# Patient Record
Sex: FEMALE | Race: OTHER | HISPANIC OR LATINO | URBAN - METROPOLITAN AREA
[De-identification: names, ages, dates, MRNs, and addresses within clinical notes are randomized per-mention and may not be internally consistent; named-entity substitution may affect disease eponyms.]

---

## 2018-10-06 ENCOUNTER — EMERGENCY (EMERGENCY)
Facility: HOSPITAL | Age: 38
LOS: 1 days | Discharge: HOME | End: 2018-10-06
Attending: EMERGENCY MEDICINE | Admitting: EMERGENCY MEDICINE
Payer: COMMERCIAL

## 2018-10-06 VITALS
WEIGHT: 95.02 LBS | HEIGHT: 59 IN | SYSTOLIC BLOOD PRESSURE: 122 MMHG | RESPIRATION RATE: 18 BRPM | HEART RATE: 71 BPM | DIASTOLIC BLOOD PRESSURE: 76 MMHG | OXYGEN SATURATION: 100 % | TEMPERATURE: 98 F

## 2018-10-06 DIAGNOSIS — S61.212A LACERATION WITHOUT FOREIGN BODY OF RIGHT MIDDLE FINGER WITHOUT DAMAGE TO NAIL, INITIAL ENCOUNTER: ICD-10-CM

## 2018-10-06 PROCEDURE — 99283 EMERGENCY DEPT VISIT LOW MDM: CPT | Mod: 25

## 2018-10-06 PROCEDURE — 99283 EMERGENCY DEPT VISIT LOW MDM: CPT

## 2018-10-06 PROCEDURE — 90715 TDAP VACCINE 7 YRS/> IM: CPT

## 2018-10-06 PROCEDURE — 90471 IMMUNIZATION ADMIN: CPT

## 2018-10-06 RX ORDER — TETANUS TOXOID, REDUCED DIPHTHERIA TOXOID AND ACELLULAR PERTUSSIS VACCINE, ADSORBED 5; 2.5; 8; 8; 2.5 [IU]/.5ML; [IU]/.5ML; UG/.5ML; UG/.5ML; UG/.5ML
0.5 SUSPENSION INTRAMUSCULAR ONCE
Qty: 0 | Refills: 0 | Status: COMPLETED | OUTPATIENT
Start: 2018-10-06 | End: 2018-10-06

## 2018-10-06 RX ADMIN — TETANUS TOXOID, REDUCED DIPHTHERIA TOXOID AND ACELLULAR PERTUSSIS VACCINE, ADSORBED 0.5 MILLILITER(S): 5; 2.5; 8; 8; 2.5 SUSPENSION INTRAMUSCULAR at 21:13

## 2018-10-06 NOTE — ED PROVIDER NOTE - ATTENDING CONTRIBUTION TO CARE
Dr. Talyor: I performed a face to face bedside interview with patient regarding history of present illness, review of symptoms and past medical history. I completed an independent physical exam.  I have discussed patient's plan of care with PA.   I agree with note as stated above, having amended the EMR as needed to reflect my findings.   This includes HISTORY OF PRESENT ILLNESS, HIV, PAST MEDICAL/SURGICAL/FAMILY/SOCIAL HISTORY, ALLERGIES AND HOME MEDICATIONS, REVIEW OF SYSTEMS, PHYSICAL EXAM, and any PROGRESS NOTES during the time I functioned as the attending physician for this patient.  right 3rd fingertip avulsion laceration with small amount of bleeding, from, will apply pressure and apply dsd BHARGAV Taylor DO

## 2018-10-06 NOTE — ED ADULT NURSE NOTE - NSIMPLEMENTINTERV_GEN_ALL_ED
Implemented All Universal Safety Interventions:  [place] to call system. Call bell, personal items and telephone within reach. Instruct patient to call for assistance. Room bathroom lighting operational. Non-slip footwear when patient is off stretcher. Physically safe environment: no spills, clutter or unnecessary equipment. Stretcher in lowest position, wheels locked, appropriate side rails in place.

## 2018-10-06 NOTE — ED PROVIDER NOTE - MEDICAL DECISION MAKING DETAILS
37 y/o F with avulsion laceration not suturable- Up date Tdap, wound care, pressure dressing, PMD follow up, infection precautions

## 2018-10-06 NOTE — ED PROVIDER NOTE - OBJECTIVE STATEMENT
37 y/o F no pmh presents with right 3rd finger tip laceration occurring at 3:30pm today. Works at a [employer] and states she "sliced" the top of her finger in th . Stopped bleeding but then started again prior to arrival.  Denies weakness, numbness, tingling, pain with movement. Right hand dominant. Tdap- unsure

## 2018-10-06 NOTE — ED PROVIDER NOTE - PLAN OF CARE
Follow up with your PMD within 48-72 hours for wound check. Keep covered and dry for 24 hours then remove dressing and clean with soap and water daily.  Apply bacitracin and cover. Worsening, continued or ANY new concerning symptoms return to the emergency department. You received a Tetanus shot today (Tdap).

## 2018-10-06 NOTE — ED PROVIDER NOTE - PROGRESS NOTE DETAILS
Copious irrigation, Bacitracin, pressure dressing applied. Tdap up dated. Strict infection precautions discussed

## 2018-10-06 NOTE — ED PROVIDER NOTE - CARE PLAN
Principal Discharge DX:	Avulsion of skin of finger, initial encounter  Assessment and plan of treatment:	Follow up with your PMD within 48-72 hours for wound check. Keep covered and dry for 24 hours then remove dressing and clean with soap and water daily.  Apply bacitracin and cover. Worsening, continued or ANY new concerning symptoms return to the emergency department. You received a Tetanus shot today (Tdap).

## 2020-08-13 NOTE — ED ADULT NURSE NOTE - CADM POA CENTRAL LINE
No
no dermatitis, no environmental allergies, no food allergies, no immunosuppressive disorder, and no pruritus.

## 2021-04-20 ENCOUNTER — RESULT REVIEW (OUTPATIENT)
Age: 41
End: 2021-04-20

## 2022-10-27 ENCOUNTER — RESULT REVIEW (OUTPATIENT)
Age: 42
End: 2022-10-27

## 2022-10-31 ENCOUNTER — APPOINTMENT (OUTPATIENT)
Dept: MAMMOGRAPHY | Facility: HOSPITAL | Age: 42
End: 2022-10-31

## 2023-01-13 ENCOUNTER — NON-APPOINTMENT (OUTPATIENT)
Age: 43
End: 2023-01-13

## 2023-01-13 DIAGNOSIS — Z87.42 PERSONAL HISTORY OF OTHER DISEASES OF THE FEMALE GENITAL TRACT: ICD-10-CM

## 2023-01-13 DIAGNOSIS — Z86.19 PERSONAL HISTORY OF OTHER INFECTIOUS AND PARASITIC DISEASES: ICD-10-CM

## 2023-01-13 DIAGNOSIS — Z98.890 OTHER SPECIFIED POSTPROCEDURAL STATES: ICD-10-CM

## 2023-01-13 DIAGNOSIS — Z78.9 OTHER SPECIFIED HEALTH STATUS: ICD-10-CM

## 2024-02-16 ENCOUNTER — APPOINTMENT (OUTPATIENT)
Dept: RADIOLOGY | Facility: HOSPITAL | Age: 44
End: 2024-02-16
Payer: COMMERCIAL

## 2024-02-16 ENCOUNTER — OUTPATIENT (OUTPATIENT)
Dept: OUTPATIENT SERVICES | Facility: HOSPITAL | Age: 44
LOS: 1 days | End: 2024-02-16
Payer: COMMERCIAL

## 2024-02-16 DIAGNOSIS — Z00.8 ENCOUNTER FOR OTHER GENERAL EXAMINATION: ICD-10-CM

## 2024-02-16 PROCEDURE — 72100 X-RAY EXAM L-S SPINE 2/3 VWS: CPT

## 2024-02-16 PROCEDURE — 72040 X-RAY EXAM NECK SPINE 2-3 VW: CPT | Mod: 26

## 2024-02-16 PROCEDURE — 72070 X-RAY EXAM THORAC SPINE 2VWS: CPT

## 2024-02-16 PROCEDURE — 72070 X-RAY EXAM THORAC SPINE 2VWS: CPT | Mod: 26

## 2024-02-16 PROCEDURE — 72100 X-RAY EXAM L-S SPINE 2/3 VWS: CPT | Mod: 26

## 2024-02-16 PROCEDURE — 72040 X-RAY EXAM NECK SPINE 2-3 VW: CPT

## 2024-04-11 ENCOUNTER — NON-APPOINTMENT (OUTPATIENT)
Age: 44
End: 2024-04-11